# Patient Record
Sex: FEMALE | Race: WHITE | NOT HISPANIC OR LATINO | Employment: FULL TIME | URBAN - METROPOLITAN AREA
[De-identification: names, ages, dates, MRNs, and addresses within clinical notes are randomized per-mention and may not be internally consistent; named-entity substitution may affect disease eponyms.]

---

## 2017-01-04 ENCOUNTER — OFFICE VISIT (OUTPATIENT)
Dept: SURGERY | Facility: CLINIC | Age: 52
End: 2017-01-04
Payer: COMMERCIAL

## 2017-01-04 VITALS
TEMPERATURE: 98 F | WEIGHT: 174.19 LBS | DIASTOLIC BLOOD PRESSURE: 85 MMHG | HEART RATE: 88 BPM | BODY MASS INDEX: 24.29 KG/M2 | SYSTOLIC BLOOD PRESSURE: 127 MMHG

## 2017-01-04 DIAGNOSIS — R10.13 EPIGASTRIC ABDOMINAL PAIN: Primary | ICD-10-CM

## 2017-01-04 PROCEDURE — 99999 PR PBB SHADOW E&M-EST. PATIENT-LVL III: CPT | Mod: PBBFAC,,, | Performed by: SURGERY

## 2017-01-04 PROCEDURE — 3079F DIAST BP 80-89 MM HG: CPT | Mod: S$GLB,,, | Performed by: SURGERY

## 2017-01-04 PROCEDURE — 99203 OFFICE O/P NEW LOW 30 MIN: CPT | Mod: S$GLB,,, | Performed by: SURGERY

## 2017-01-04 PROCEDURE — 1159F MED LIST DOCD IN RCRD: CPT | Mod: S$GLB,,, | Performed by: SURGERY

## 2017-01-04 PROCEDURE — 3074F SYST BP LT 130 MM HG: CPT | Mod: S$GLB,,, | Performed by: SURGERY

## 2017-01-04 NOTE — PROGRESS NOTES
History & Physical    SUBJECTIVE:     History of Present Illness:  Patient is a 51 y.o. female present for eval of RUQ/epigastric abdominal pain. She reports the pain was so severe on [place] alaina that she went to ER. She had CT and U/S done at [facility] which were normal as well as labs (results in epic under media). She then was seen in [facility] ER and HIDA scan showed EF 73%. The patient reports she had nausea and severe pain that felt like her previous symptoms of sharp and stabbing although she also endorses crampy pain. The pain has progressed to constantly hurting and usually worse in the evening after dinner. She has also noticed increased frequency of bowel movements up to 4-5 times per day. She previously had EGD done a few years ago which showed gastritis from NSAID use per patient. She continues to smoke and avoid NSAID although has been taking ib profen since pain started.    Chief Complaint   Patient presents with    Consult       Review of patient's allergies indicates:   Allergen Reactions    Asa [aspirin] Swelling       Current Outpatient Prescriptions   Medication Sig Dispense Refill    ketorolac (TORADOL) 10 mg tablet Take 1 tablet (10 mg total) by mouth every 6 (six) hours as needed for Pain. Do not take more than 5 days in a row. 16 tablet 0     No current facility-administered medications for this visit.        History reviewed. No pertinent past medical history.  Past Surgical History   Procedure Laterality Date    Hysterectomy       section       History reviewed. No pertinent family history.  Social History   Substance Use Topics    Smoking status: Never Smoker    Smokeless tobacco: None    Alcohol use No        Review of Systems:  Review of Systems   Constitutional: Negative for chills and fever.   HENT: Negative for congestion.    Eyes: Negative for visual disturbance.   Respiratory: Negative for cough and shortness of breath.    Cardiovascular: Negative for chest pain,  palpitations and leg swelling.   Gastrointestinal: Positive for abdominal pain. Negative for abdominal distention, constipation, diarrhea, nausea and vomiting.   Endocrine: Negative for polyuria.   Genitourinary: Negative for dysuria.   Skin: Negative for rash.   Neurological: Negative for dizziness and light-headedness.   Hematological: Negative for adenopathy.       OBJECTIVE:     Vital Signs (Most Recent)  Temp: 98 °F (36.7 °C) (01/04/17 1553)  Pulse: 88 (01/04/17 1553)  BP: 127/85 (01/04/17 1553)     79 kg (174 lb 2.6 oz)     Physical Exam:  Physical Exam   Constitutional: She is oriented to person, place, and time. She appears well-developed and well-nourished.   HENT:   Head: Normocephalic and atraumatic.   Eyes: EOM are normal.   Neck: Neck supple.   Cardiovascular: Normal rate and regular rhythm.    Pulmonary/Chest: Effort normal and breath sounds normal.   Abdominal: Soft. Bowel sounds are normal. She exhibits no distension. There is tenderness (RUQ).   Neurological: She is alert and oriented to person, place, and time.   Skin: Skin is warm and dry.   Vitals reviewed.      ASSESSMENT/PLAN:     50 y/o female with RUQ/epigastric abdominal pain    PLAN:Plan     Imaging and labs normal although pain reproduced with CCK injection  Will set pt up for EGD to eval for gastric etiology, if negative will have discuss further options: including cholecystectomy

## 2017-01-04 NOTE — LETTER
January 4, 2017      Mckenna Sharpe,   [address] [place] [address] [address] [address]           [facility] General Surgery  [address] [place] [address]  [place] [address]  Phone: [phone]  Fax: [phone]          Patient: Tabitha Santos   MR Number: [id number]   YOB: 1965   Date of Visit: 1/4/2017       Dear Dr. Mckenna Sharpe:    Thank you for referring Tabitha Santos to me for evaluation. Attached you will find relevant portions of my assessment and plan of care.    If you have questions, please do not hesitate to call me. I look forward to following Tabitha Santos along with you.    Sincerely,    Emerson Lara MD    Enclosure  CC:  No Recipients    If you would like to receive this communication electronically, please contact [email or web address] or [phone] to request more information on Provider[employer] Link access.    For providers and/or their staff who would like to refer a patient to [facility], please contact us through our one-stop-shop provider referral line, [facility] , at [phone].    If you feel you have received this communication in error or would no longer like to receive these types of communications, please e-mail [email or web address]

## 2017-01-09 ENCOUNTER — TELEPHONE (OUTPATIENT)
Dept: SURGERY | Facility: CLINIC | Age: 52
End: 2017-01-09

## 2017-01-09 NOTE — TELEPHONE ENCOUNTER
"Returned call patient called to cancel EGD 1/11/17, stated," went to primary care doctor and was dx with Shingles."  will call the office back to reschedule.  "

## 2018-10-23 ENCOUNTER — HOSPITAL ENCOUNTER (EMERGENCY)
Facility: HOSPITAL | Age: 53
Discharge: HOME | End: 2018-10-23
Attending: EMERGENCY MEDICINE
Payer: COMMERCIAL

## 2018-10-23 VITALS
SYSTOLIC BLOOD PRESSURE: 119 MMHG | HEIGHT: 71 IN | HEART RATE: 98 BPM | WEIGHT: 180.69 LBS | DIASTOLIC BLOOD PRESSURE: 78 MMHG | BODY MASS INDEX: 25.3 KG/M2 | TEMPERATURE: 99 F | RESPIRATION RATE: 18 BRPM | OXYGEN SATURATION: 99 %

## 2018-10-23 DIAGNOSIS — M79.603 ARM PAIN: ICD-10-CM

## 2018-10-23 DIAGNOSIS — M25.529 ELBOW PAIN: ICD-10-CM

## 2018-10-23 DIAGNOSIS — S52.571A OTHER CLOSED INTRA-ARTICULAR FRACTURE OF DISTAL END OF RIGHT RADIUS, INITIAL ENCOUNTER: Primary | ICD-10-CM

## 2018-10-23 DIAGNOSIS — M25.539 WRIST PAIN: ICD-10-CM

## 2018-10-23 PROCEDURE — 29105 APPLICATION LONG ARM SPLINT: CPT | Mod: RT

## 2018-10-23 PROCEDURE — 25000003 PHARM REV CODE 250: Performed by: EMERGENCY MEDICINE

## 2018-10-23 PROCEDURE — 99284 EMERGENCY DEPT VISIT MOD MDM: CPT | Mod: 25

## 2018-10-23 RX ORDER — HYDROCODONE BITARTRATE AND ACETAMINOPHEN 7.5; 325 MG/1; MG/1
1 TABLET ORAL EVERY 6 HOURS PRN
Qty: 33 TABLET | Refills: 0 | Status: SHIPPED | OUTPATIENT
Start: 2018-10-23 | End: 2018-11-02

## 2018-10-23 RX ORDER — HYDROCODONE BITARTRATE AND ACETAMINOPHEN 10; 325 MG/1; MG/1
1 TABLET ORAL
Status: COMPLETED | OUTPATIENT
Start: 2018-10-23 | End: 2018-10-23

## 2018-10-23 RX ADMIN — HYDROCODONE BITARTRATE AND ACETAMINOPHEN 1 TABLET: 10; 325 TABLET ORAL at 09:10

## 2018-10-23 NOTE — ED PROVIDER NOTES
SCRIBE #1 NOTE: I, Shirley Dickerson, am scribing for, and in the presence of, Aaron Olmos MD. I have scribed the entire note.      History      Chief Complaint   Patient presents with    Arm Pain     pt reports pain to her Rt arm adn wrist after falling off a hover board yesterday       Review of patient's allergies indicates:   Allergen Reactions    Asa [aspirin] Swelling        HPI   HPI    10/23/2018, 9:16 AM   History obtained from the patient      History of Present Illness: Tabitha Santos is a 52 y.o. female patient who presents to the Emergency Department for RUE pain which onset suddenly after falling off of a hover board yesterday. Pt reports landing on R arm. Symptoms are constant and moderate in severity.  No mitigating or exacerbating factors reported. No associated sxs reported. Patient denies any head injury/trauma, LOC,  HA, dizziness, back pain, neck pain, abd pain, CP, SOB, extremity weakness/numbness, wound, other pain/injury, decreased ROM of RUE, and all other sxs at this time. No further complaints or concerns at this time.         Arrival mode: Personal vehicle      PCP: Primary Doctor No       Past Medical History:  Hx reviewed, not pertinent.    Past Surgical History:  Past Surgical History:   Procedure Laterality Date     SECTION      HYSTERECTOMY           Family History:  Hx reviewed, not pertinent.     Social History:  Social History     Tobacco Use    Smoking status: Never Smoker   Substance and Sexual Activity    Alcohol use: No    Drug use: No    Sexual activity: Unknown       ROS   Review of Systems   Constitutional: Negative for fever.   HENT: Negative for sore throat.    Respiratory: Negative for shortness of breath.    Cardiovascular: Negative for chest pain.   Gastrointestinal: Negative for abdominal pain and nausea.   Genitourinary: Negative for dysuria.   Musculoskeletal: Negative for back pain and neck pain.        + RUE pain -other pain/injury  -decreased ROM   Skin: Negative for rash and wound.   Neurological: Negative for dizziness, weakness, numbness and headaches.        -head injury/trauma -LOC   Hematological: Does not bruise/bleed easily.   All other systems reviewed and are negative.      Physical Exam      Initial Vitals [10/23/18 0911]   BP Pulse Resp Temp SpO2   138/85 89 18 98.7 °F (37.1 °C) 95 %      MAP       --          Physical Exam  Nursing Notes and Vital Signs Reviewed.  Constitutional: Patient is in no acute distress. Well-developed and well-nourished.  Head: Atraumatic. Normocephalic.  Eyes: PERRL. EOM intact. Conjunctivae are not pale. No scleral icterus.  ENT: Mucous membranes are moist. Oropharynx is clear and symmetric.    Neck: Supple. Full ROM. No lymphadenopathy.  Cardiovascular: Regular rate. Regular rhythm. No murmurs, rubs, or gallops. Distal pulses are 2+ and symmetric.  Pulmonary/Chest: No respiratory distress. Clear to auscultation bilaterally. No wheezing or rales.  Abdominal: Soft and non-distended.  There is no tenderness.  No rebound, guarding, or rigidity.   Musculoskeletal: Moves all extremities. No obvious deformities. No edema.   RUE: has no evident deformity. Tenderness to R elbow, R wrist, R forearm, and R humerus. Negative for swelling. ROM is normal. Cap refill distally is <2 seconds. Radial pulses are equal and 2+ bilaterally. No motor deficit. No distal sensory deficit.  Skin: Warm and dry.  Neurological:  Alert, awake, and appropriate.  Normal speech.  No acute focal neurological deficits are appreciated.  Psychiatric: Normal affect. Good eye contact. Appropriate in content.    ED Course    Orthopedic Injury  Date/Time: 10/23/2018 10:07 AM  Performed by: Aaron Olmos MD  Authorized by: Aaron Olmos MD     Consent Done?:  Yes  Universal Protocol:     Verbal consent obtained?: Yes      Consent given by:  Patient    Patient states understanding of procedure being performed: Yes    Injury:     Injury  "location:  Wrist    Location details:  Right wrist    Injury type:  Fracture    Fracture type: distal radius      Fracture type: distal radius        Pre-procedure assessment:     Neurovascular status: Neurovascularly intact      Distal perfusion: normal      Neurological function: normal      Range of motion: normal      Local anesthesia used?: No      Patient sedated?: No        Selections made in this section will also lock the Injury type section above.:     Manipulation performed?: No      Immobilization:  Splint    Splint type:  Sugar tong    Supplies used:  Ortho-Glass    Complications: No      Specimens: No      Implants: No    Post-procedure assessment:     Neurovascular status: Neurovascularly intact      Distal perfusion: normal      Neurological function: normal      Range of motion: splinted      Patient tolerance:  Patient tolerated the procedure well with no immediate complications      ED Vital Signs:  Vitals:    10/23/18 0911   BP: 138/85   Pulse: 89   Resp: 18   Temp: 98.7 °F (37.1 °C)   TempSrc: Oral   SpO2: 95%   Weight: 81.9 kg (180 lb 10.7 oz)   Height: 5' 11" (1.803 m)       Abnormal Lab Results:  Labs Reviewed - No data to display     All Lab Results:      Imaging Results:  Imaging Results          X-Ray Humerus 2 View Right (Final result)  Result time 10/23/18 09:48:17    Final result by Enoc Moe MD (10/23/18 09:48:17)                 Impression:      No acute findings.      Electronically signed by: Enoc Moe MD  Date:    10/23/2018  Time:    09:48             Narrative:    EXAMINATION:  XR HUMERUS 2 VIEW RIGHT    CLINICAL HISTORY:  Pain in arm, unspecified    TECHNIQUE:  Standard radiography performed.    COMPARISON:  None    FINDINGS:  The right humerus is intact.  There is a small calcification adjacent to the humeral head most consistent with calcific bursitis or tendinitis.                               X-Ray Wrist Complete Right (Final result)  Result time 10/23/18 " 09:46:54    Final result by Enoc Moe MD (10/23/18 09:46:54)                 Impression:      Findings consistent with nondisplaced distal radial fracture involving the articular surface of the radiocarpal joint.      Electronically signed by: Enoc Moe MD  Date:    10/23/2018  Time:    09:46             Narrative:    EXAMINATION:  XR WRIST COMPLETE 3 VIEWS RIGHT    CLINICAL HISTORY:  Pain in unspecified wrist    TECHNIQUE:  Standard radiography performed.    COMPARISON:  None    FINDINGS:  There is a lucent fracture line involving the distal articular surface of the right radius.  No significant displacement or depression is seen.    The distal ulna appears intact.    Mild incidental DJD of the thumb base is present.                               X-Ray Elbow Complete Right (Final result)  Result time 10/23/18 09:47:33    Final result by Enoc Moe MD (10/23/18 09:47:33)                 Impression:      Negative exam.      Electronically signed by: Enoc Moe MD  Date:    10/23/2018  Time:    09:47             Narrative:    EXAMINATION:  XR ELBOW COMPLETE 3 VIEW RIGHT    CLINICAL HISTORY:  Right elbow injury with joint pain, pain in unspecified elbow    TECHNIQUE:  Standard radiography performed.    COMPARISON:  None    FINDINGS:  Bone density and architecture are normal.  No acute findings.                               Current Discharge Medication List      START taking these medications    Details   HYDROcodone-acetaminophen (NORCO) 7.5-325 mg per tablet Take 1 tablet by mouth every 6 (six) hours as needed for Pain.  Qty: 33 tablet, Refills: 0                    The Emergency Provider reviewed the vital signs and test results, which are outlined above.    ED Discussion     10:03 AM: Re-evaluated pt.  D/w pt all pertinent results. D/w pt any concerns expressed at this time. Answered all questions. Pt expresses understanding at this time.    10:08 AM: Reassessed pt at this time.   Pt is awake, alert, and in NAD at this time. Discussed with pt all pertinent ED information and results. Discussed pt dx and plan of tx. Gave pt all f/u and return to the ED instructions. All questions and concerns were addressed at this time. Pt expresses understanding of information and instructions, and is comfortable with plan to discharge. Pt is stable for discharge.    Driving or other activities under influence of medications - Patient and/or family/caretaker was given a prescription for, or instructed to use a medicine that may impair ability to drive, operate machinery, or participate in other potentially dangerous activities.  Patient was instructed not to participate in these activities while under the influence of these medications.    I discussed with patient and/or family/caretaker that evaluation in the ED does not suggest any emergent or life threatening medical conditions requiring immediate intervention beyond what was provided in the ED, and I believe patient is safe for discharge.  Regardless, an unremarkable evaluation in the ED does not preclude the development or presence of a serious of life threatening condition. As such, patient was instructed to return immediately for any worsening or change in current symptoms.      ED Medication(s):  Medications   HYDROcodone-acetaminophen  mg per tablet 1 tablet (1 tablet Oral Given 10/23/18 0949)       Follow-up Information     Ortho In 2 days.    Contact information:  [phone]                   Medical Decision Making    Medical Decision Making:   Clinical Tests:   Radiological Study: Ordered and Reviewed           Scribe Attestation:   Scribe #1: I performed the above scribed service and the documentation accurately describes the services I performed. I attest to the accuracy of the note.    Attending:   Physician Attestation Statement for Scribe #1: I, Aaron Olmos MD, personally performed the services described in this documentation, as  scribed by Shirley Dickerson, in my presence, and it is both accurate and complete.          Clinical Impression       ICD-10-CM ICD-9-CM   1. Other closed intra-articular fracture of distal end of right radius, initial encounter S52.571A 813.42   2. Arm pain M79.603 729.5   3. Elbow pain M25.529 719.42   4. Wrist pain M25.539 719.43       Disposition:   Disposition: Discharged  Condition: Stable         Aaron Olmos MD  10/23/18 1028

## 2018-10-25 ENCOUNTER — OFFICE VISIT (OUTPATIENT)
Dept: ORTHOPEDICS | Facility: CLINIC | Age: 53
End: 2018-10-25
Payer: COMMERCIAL

## 2018-10-25 VITALS
BODY MASS INDEX: 25.2 KG/M2 | HEART RATE: 80 BPM | WEIGHT: 180 LBS | HEIGHT: 71 IN | DIASTOLIC BLOOD PRESSURE: 74 MMHG | RESPIRATION RATE: 19 BRPM | SYSTOLIC BLOOD PRESSURE: 131 MMHG

## 2018-10-25 DIAGNOSIS — G89.11 ACUTE PAIN OF RIGHT SHOULDER DUE TO TRAUMA: ICD-10-CM

## 2018-10-25 DIAGNOSIS — S62.101A WRIST FRACTURE, CLOSED, RIGHT, INITIAL ENCOUNTER: Primary | ICD-10-CM

## 2018-10-25 DIAGNOSIS — M25.511 ACUTE PAIN OF RIGHT SHOULDER DUE TO TRAUMA: ICD-10-CM

## 2018-10-25 PROCEDURE — 99999 PR PBB SHADOW E&M-EST. PATIENT-LVL III: CPT | Mod: PBBFAC,,, | Performed by: PHYSICIAN ASSISTANT

## 2018-10-25 PROCEDURE — 3008F BODY MASS INDEX DOCD: CPT | Mod: CPTII,S$GLB,, | Performed by: PHYSICIAN ASSISTANT

## 2018-10-25 PROCEDURE — 99204 OFFICE O/P NEW MOD 45 MIN: CPT | Mod: S$GLB,,, | Performed by: PHYSICIAN ASSISTANT

## 2018-10-25 RX ORDER — MELATONIN 5 MG
5 CAPSULE ORAL
COMMUNITY

## 2018-10-25 RX ORDER — TRAMADOL HYDROCHLORIDE 50 MG/1
50 TABLET ORAL EVERY 6 HOURS PRN
Qty: 30 TABLET | Refills: 0 | Status: SHIPPED | OUTPATIENT
Start: 2018-10-25 | End: 2018-11-04

## 2018-10-25 NOTE — PROGRESS NOTES
CC:  52-year-old female right arm pain    Date of injury 2018    HPI:  On the date above the patient fell off her Hovver board landing on her right arm.  She reports emergency room    PMH:  History reviewed. No pertinent past medical history.    PSH:    Past Surgical History:   Procedure Laterality Date     SECTION      HYSTERECTOMY         Family Hx:  History reviewed. No pertinent family history.    Allergy:    Review of patient's allergies indicates:   Allergen Reactions    Asa [aspirin] Swelling       Medication:    Current Outpatient Medications:     HYDROcodone-acetaminophen (NORCO) 7.5-325 mg per tablet, Take 1 tablet by mouth every 6 (six) hours as needed for Pain., Disp: 33 tablet, Rfl: 0    Social History:    Social History     Socioeconomic History    Marital status: Single     Spouse name: Not on file    Number of children: Not on file    Years of education: Not on file    Highest education level: Not on file   Social Needs    Financial resource strain: Not on file    Food insecurity - worry: Not on file    Food insecurity - inability: Not on file    Transportation needs - medical: Not on file    Transportation needs - non-medical: Not on file   Occupational History    Not on file   Tobacco Use    Smoking status: Never Smoker   Substance and Sexual Activity    Alcohol use: No    Drug use: No    Sexual activity: Not on file   Other Topics Concern    Not on file   Social History Narrative    Not on file       Vitals:   There were no vitals taken for this visit.     ROS:  GENERAL: No fever, chills, fatigability or weight loss.  NEUROLOGIC: No history of seizures, paralysis, alteration of gait or coordination.  MUSCULOSKELETAL: See HPI    PE:  APPEARANCE: Well nourished, well developed, in no acute distress.   MUSCULOSKELETAL  MUSCULOSKELETAL:   Right shoulder elbow and wrist   cast splint intact., 2 plus distal pulses, light touch intact Right  upper extremity.  All  digits are warm. No erythema, no warmth, no drainage, tenderness along the right AC joint.  No deformity appreciated.  To his long the right elbow.  Her fingers blood very well.  Assessment:  Patient stands she has a fractured right wrist.  The requires a CT to fully evaluate           Diagnosis:              1.  Right wrist fracture               2.  Right elbow contusion                3.  Right shoulder pain status post trauma    Diagnostic Studies  MRI-No   X-Ray-YES, I agree with the findings of  Wrist-  There is a lucent fracture line involving the distal articular surface of the right radius.  No significant displacement or depression is seen.    The distal ulna appears intact.    Mild incidental DJD of the thumb base is present.      Impression       Findings consistent with nondisplaced distal radial fracture involving the articular surface of the radiocarpal joint.         EMG/NCV-No  Arthrogram-No  Bone Scan-No  CT Scan-of the right wrist and right elbow staples trauma and fractures to further definitive the fracture.    Plan:                                                 1. PT-no                                                 2.OT-no                                          3.NSAID-no                                        4. Narcotics-      tramadol                              5. Wound care-N/A                                 6. Rest-no                                           7. Surgery-no                                         8. DINA Hose-no                                    9. Anticoagulation therapy-no               10. Elevation-no                                     11. Crutches-no                                    12. Walker-no             13. Cane no                        14. Referral-no                                     15.Injection-no                            16. Splint   /    Cast   /   Cast Shoe-current splint was modified with the cast material cut from on the palm of her hand.               17. RICE            18. Follow up-  after CT

## 2018-11-08 ENCOUNTER — TELEPHONE (OUTPATIENT)
Dept: RADIOLOGY | Facility: HOSPITAL | Age: 53
End: 2018-11-08

## 2018-11-12 ENCOUNTER — TELEPHONE (OUTPATIENT)
Dept: RADIOLOGY | Facility: HOSPITAL | Age: 53
End: 2018-11-12

## 2018-11-21 ENCOUNTER — HOSPITAL ENCOUNTER (EMERGENCY)
Facility: HOSPITAL | Age: 53
Discharge: HOME | End: 2018-11-21
Attending: EMERGENCY MEDICINE
Payer: COMMERCIAL

## 2018-11-21 VITALS
DIASTOLIC BLOOD PRESSURE: 58 MMHG | SYSTOLIC BLOOD PRESSURE: 106 MMHG | WEIGHT: 179.88 LBS | TEMPERATURE: 98 F | OXYGEN SATURATION: 99 % | BODY MASS INDEX: 24.36 KG/M2 | RESPIRATION RATE: 19 BRPM | HEIGHT: 72 IN | HEART RATE: 67 BPM

## 2018-11-21 DIAGNOSIS — R10.10 PAIN OF UPPER ABDOMEN: Primary | ICD-10-CM

## 2018-11-21 DIAGNOSIS — R07.9 CHEST PAIN: ICD-10-CM

## 2018-11-21 LAB
ALBUMIN SERPL BCP-MCNC: 3.8 G/DL
ALP SERPL-CCNC: 62 U/L
ALT SERPL W/O P-5'-P-CCNC: 14 U/L
ANION GAP SERPL CALC-SCNC: 8 MMOL/L
AST SERPL-CCNC: 24 U/L
BASOPHILS # BLD AUTO: 0.01 K/UL
BASOPHILS NFR BLD: 0.1 %
BILIRUB SERPL-MCNC: 0.5 MG/DL
BNP SERPL-MCNC: 58 PG/ML
BUN SERPL-MCNC: 21 MG/DL
CALCIUM SERPL-MCNC: 8.9 MG/DL
CHLORIDE SERPL-SCNC: 106 MMOL/L
CK SERPL-CCNC: 252 U/L
CO2 SERPL-SCNC: 25 MMOL/L
CREAT SERPL-MCNC: 0.7 MG/DL
DIFFERENTIAL METHOD: ABNORMAL
EOSINOPHIL # BLD AUTO: 0.1 K/UL
EOSINOPHIL NFR BLD: 0.6 %
ERYTHROCYTE [DISTWIDTH] IN BLOOD BY AUTOMATED COUNT: 13.1 %
EST. GFR  (AFRICAN AMERICAN): >60 ML/MIN/1.73 M^2
EST. GFR  (NON AFRICAN AMERICAN): >60 ML/MIN/1.73 M^2
GLUCOSE SERPL-MCNC: 92 MG/DL
HCT VFR BLD AUTO: 38.8 %
HGB BLD-MCNC: 13 G/DL
INR PPP: 0.9
LIPASE SERPL-CCNC: 25 U/L
LYMPHOCYTES # BLD AUTO: 2.7 K/UL
LYMPHOCYTES NFR BLD: 24.7 %
MCH RBC QN AUTO: 31.6 PG
MCHC RBC AUTO-ENTMCNC: 33.5 G/DL
MCV RBC AUTO: 94 FL
MONOCYTES # BLD AUTO: 0.7 K/UL
MONOCYTES NFR BLD: 5.9 %
NEUTROPHILS # BLD AUTO: 7.6 K/UL
NEUTROPHILS NFR BLD: 68.7 %
PLATELET # BLD AUTO: 183 K/UL
PMV BLD AUTO: 9.7 FL
POTASSIUM SERPL-SCNC: 4.1 MMOL/L
PROT SERPL-MCNC: 6.6 G/DL
PROTHROMBIN TIME: 10.3 SEC
RBC # BLD AUTO: 4.12 M/UL
SODIUM SERPL-SCNC: 139 MMOL/L
TROPONIN I SERPL DL<=0.01 NG/ML-MCNC: <0.006 NG/ML
WBC # BLD AUTO: 11.02 K/UL

## 2018-11-21 PROCEDURE — 83690 ASSAY OF LIPASE: CPT

## 2018-11-21 PROCEDURE — 25000003 PHARM REV CODE 250: Performed by: NURSE PRACTITIONER

## 2018-11-21 PROCEDURE — 93005 ELECTROCARDIOGRAM TRACING: CPT

## 2018-11-21 PROCEDURE — 93010 ELECTROCARDIOGRAM REPORT: CPT | Mod: ,,, | Performed by: INTERNAL MEDICINE

## 2018-11-21 PROCEDURE — 84484 ASSAY OF TROPONIN QUANT: CPT

## 2018-11-21 PROCEDURE — 83880 ASSAY OF NATRIURETIC PEPTIDE: CPT

## 2018-11-21 PROCEDURE — 82550 ASSAY OF CK (CPK): CPT

## 2018-11-21 PROCEDURE — 99285 EMERGENCY DEPT VISIT HI MDM: CPT | Mod: 25

## 2018-11-21 PROCEDURE — 85610 PROTHROMBIN TIME: CPT

## 2018-11-21 PROCEDURE — 85025 COMPLETE CBC W/AUTO DIFF WBC: CPT

## 2018-11-21 PROCEDURE — 80053 COMPREHEN METABOLIC PANEL: CPT

## 2018-11-21 RX ORDER — OMEPRAZOLE 20 MG/1
40 CAPSULE, DELAYED RELEASE ORAL DAILY
Qty: 60 CAPSULE | Refills: 0 | Status: SHIPPED | OUTPATIENT
Start: 2018-11-21 | End: 2018-12-21

## 2018-11-21 RX ORDER — SUCRALFATE 1 G/1
1 TABLET ORAL
Qty: 28 TABLET | Refills: 0 | Status: SHIPPED | OUTPATIENT
Start: 2018-11-21 | End: 2018-11-28

## 2018-11-21 RX ADMIN — DICYCLOMINE HYDROCHLORIDE 50 ML: 10 SOLUTION ORAL at 10:11

## 2018-11-21 NOTE — ED PROVIDER NOTES
Encounter Date: 2018       History     Chief Complaint   Patient presents with    Chest Pain     3 sprays of NTG per [facility]     52 year old female with complaint of upper epigastric pain with radiation into chest since 8 am this morning.  Reports pain occurred at rest and suddenly and felt like a stabbing type pain.  Reports pain began to subside prior to ambulance arriving and was given 3 sprays of nitro.  Pt still reports pain as a 5.  No SOB. No diaphoresis.            Review of patient's allergies indicates:   Allergen Reactions    Asa [aspirin] Swelling     History reviewed. No pertinent past medical history.  Past Surgical History:   Procedure Laterality Date     SECTION      HYSTERECTOMY       History reviewed. No pertinent family history.  Social History     Tobacco Use    Smoking status: Current Every Day Smoker    Smokeless tobacco: Never Used   Substance Use Topics    Alcohol use: No    Drug use: No     Review of Systems   Constitutional: Negative for fever.   HENT: Negative for sore throat.    Respiratory: Negative for shortness of breath.    Cardiovascular: Positive for chest pain.   Gastrointestinal: Positive for abdominal pain. Negative for nausea.   Genitourinary: Negative for dysuria.   Musculoskeletal: Negative for back pain.   Skin: Negative for rash.   Neurological: Negative for weakness.   Hematological: Does not bruise/bleed easily.       Physical Exam     Initial Vitals [18 0926]   BP Pulse Resp Temp SpO2   106/61 73 17 97.9 °F (36.6 °C) (!) 94 %      MAP       --         Physical Exam    Nursing note and vitals reviewed.  Constitutional: She appears well-developed and well-nourished.   HENT:   Head: Normocephalic and atraumatic.   Eyes: Conjunctivae and EOM are normal. Pupils are equal, round, and reactive to light.   Neck: Normal range of motion. Neck supple.   Cardiovascular: Normal rate, regular rhythm, normal heart sounds and intact distal pulses.    Pulmonary/Chest: Breath sounds normal.   Abdominal: Soft. There is tenderness. There is no rebound and no guarding.   Upper abdominal tenderness, no guarding   Musculoskeletal: Normal range of motion.   Neurological: She is alert and oriented to person, place, and time. She has normal strength and normal reflexes.   Skin: Skin is warm and dry.   Psychiatric: She has a normal mood and affect. Her behavior is normal. Thought content normal.         ED Course   Procedures  Labs Reviewed   CBC W/ AUTO DIFFERENTIAL - Abnormal; Notable for the following components:       Result Value    MCH 31.6 (*)     All other components within normal limits   COMPREHENSIVE METABOLIC PANEL   B-TYPE NATRIURETIC PEPTIDE   TROPONIN I   PROTIME-INR     EKG Readings: (Independently Interpreted)   Other EKG Interpretations: EKG: NSR with rate of 69, no st or t wave abnormalities       Imaging Results    None       Imaging Results          X-Ray Chest PA And Lateral (Final result)  Result time 11/21/18 10:09:16    Final result by SHILOH Briceño Sr., MD (11/21/18 10:09:16)                 Impression:      Normal study.      Electronically signed by: Todd Briceño MD  Date:    11/21/2018  Time:    10:09             Narrative:    EXAMINATION:  XR CHEST PA AND LATERAL    CLINICAL HISTORY:  Chest Pain;    COMPARISON:  12/26/2016    FINDINGS:  The size and contour of the heart are normal. The lungs are clear. There is no pneumothorax or pleural effusion.                                   Medical Decision Making:   Pt had complete relief after GI cocktail.  Pain began epigastric region and radiated up to chest.  Pain was constant until GI cocktail given.  Initial cardiac workout negative.  Recommended to repeat cardiac enzymes at the 6 hour giuliana from onset of pain.  Pt does not want to wait for repeat blood work and pt requesting discharge.  Pt completely ware that MI can not be completely ruled out without second set of cardiac enzymes.  Pt  understands risks and states will return for return of chest pain.      Labs Reviewed   CBC W/ AUTO DIFFERENTIAL - Abnormal; Notable for the following components:       Result Value    MCH 31.6 (*)     All other components within normal limits   COMPREHENSIVE METABOLIC PANEL - Abnormal; Notable for the following components:    BUN, Bld 21 (*)     All other components within normal limits   B-TYPE NATRIURETIC PEPTIDE   TROPONIN I   PROTIME-INR   CK   LIPASE                         Clinical Impression:   The primary encounter diagnosis was Pain of upper abdomen. A diagnosis of Chest pain was also pertinent to this visit.                             Beltran Tamayo NP  11/21/18 2401

## 2021-03-07 ENCOUNTER — HOSPITAL ENCOUNTER (EMERGENCY)
Facility: HOSPITAL | Age: 56
Discharge: HOME | End: 2021-03-07
Attending: EMERGENCY MEDICINE
Payer: COMMERCIAL

## 2021-03-07 VITALS
TEMPERATURE: 96 F | HEIGHT: 72 IN | DIASTOLIC BLOOD PRESSURE: 73 MMHG | HEART RATE: 80 BPM | OXYGEN SATURATION: 99 % | RESPIRATION RATE: 14 BRPM | BODY MASS INDEX: 24.4 KG/M2 | SYSTOLIC BLOOD PRESSURE: 130 MMHG

## 2021-03-07 DIAGNOSIS — J20.9 ACUTE BRONCHITIS, UNSPECIFIED ORGANISM: Primary | ICD-10-CM

## 2021-03-07 DIAGNOSIS — J32.9 SINUSITIS, UNSPECIFIED CHRONICITY, UNSPECIFIED LOCATION: ICD-10-CM

## 2021-03-07 DIAGNOSIS — R05.9 COUGH: ICD-10-CM

## 2021-03-07 LAB
INFLUENZA A, MOLECULAR: NEGATIVE
INFLUENZA B, MOLECULAR: NEGATIVE
SARS-COV-2 RDRP RESP QL NAA+PROBE: NEGATIVE
SPECIMEN SOURCE: NORMAL

## 2021-03-07 PROCEDURE — 99284 EMERGENCY DEPT VISIT MOD MDM: CPT | Mod: 25

## 2021-03-07 PROCEDURE — 87502 INFLUENZA DNA AMP PROBE: CPT | Performed by: NURSE PRACTITIONER

## 2021-03-07 PROCEDURE — 25000003 PHARM REV CODE 250: Performed by: NURSE PRACTITIONER

## 2021-03-07 PROCEDURE — U0002 COVID-19 LAB TEST NON-CDC: HCPCS | Performed by: NURSE PRACTITIONER

## 2021-03-07 RX ORDER — FLUTICASONE PROPIONATE 50 MCG
1 SPRAY, SUSPENSION (ML) NASAL 2 TIMES DAILY
Qty: 15.8 ML | Refills: 0 | Status: SHIPPED | OUTPATIENT
Start: 2021-03-07 | End: 2021-04-06

## 2021-03-07 RX ORDER — PROMETHAZINE HYDROCHLORIDE AND DEXTROMETHORPHAN HYDROBROMIDE 6.25; 15 MG/5ML; MG/5ML
10 SYRUP ORAL EVERY 6 HOURS PRN
Qty: 150 ML | Refills: 0 | Status: SHIPPED | OUTPATIENT
Start: 2021-03-07 | End: 2021-03-17

## 2021-03-07 RX ORDER — CETIRIZINE HYDROCHLORIDE 10 MG/1
10 TABLET ORAL DAILY
Qty: 30 TABLET | Refills: 0 | Status: SHIPPED | OUTPATIENT
Start: 2021-03-07 | End: 2022-03-07

## 2021-03-07 RX ORDER — ACETAMINOPHEN 500 MG
1000 TABLET ORAL
Status: COMPLETED | OUTPATIENT
Start: 2021-03-07 | End: 2021-03-07

## 2021-03-07 RX ORDER — AZITHROMYCIN 250 MG/1
250 TABLET, FILM COATED ORAL DAILY
Qty: 6 TABLET | Refills: 0 | Status: SHIPPED | OUTPATIENT
Start: 2021-03-07

## 2021-03-07 RX ADMIN — ACETAMINOPHEN 1000 MG: 500 TABLET ORAL at 12:03

## 2022-05-26 ENCOUNTER — HOSPITAL ENCOUNTER (EMERGENCY)
Facility: HOSPITAL | Age: 57
Discharge: HOME | End: 2022-05-26
Attending: FAMILY MEDICINE
Payer: COMMERCIAL

## 2022-05-26 VITALS
OXYGEN SATURATION: 97 % | DIASTOLIC BLOOD PRESSURE: 68 MMHG | HEIGHT: 72 IN | WEIGHT: 175 LBS | HEART RATE: 82 BPM | TEMPERATURE: 99 F | RESPIRATION RATE: 18 BRPM | SYSTOLIC BLOOD PRESSURE: 137 MMHG | BODY MASS INDEX: 23.7 KG/M2

## 2022-05-26 DIAGNOSIS — M25.512 LEFT SHOULDER PAIN: ICD-10-CM

## 2022-05-26 PROCEDURE — 99283 EMERGENCY DEPT VISIT LOW MDM: CPT

## 2022-05-26 RX ORDER — TRAMADOL HYDROCHLORIDE 50 MG/1
50 TABLET ORAL EVERY 6 HOURS PRN
Qty: 12 TABLET | Refills: 0 | OUTPATIENT
Start: 2022-05-26 | End: 2023-01-14

## 2022-05-26 NOTE — ED PROVIDER NOTES
HISTORY     Chief Complaint   Patient presents with    Shoulder Pain     Pt states that a box fell on her left shoulder while at work about end of March ( workman's comp completed at the time ), now reports worsened pain  and limited ability to use the affected site      Review of patient's allergies indicates:   Allergen Reactions    Asa [aspirin] Swelling        HPI   48-year-old female presents emergency department for left shoulder pain.  Patient reports back in March she had a box that was filled with laundry detergent that fell onto her left shoulder.  Patient reports her pain is gradually worsened.  Patient reports she has tried over-the-counter medications without any relief.  Patient denies any fever, chills, chest pain, back pain, shortness of breath, and all other concerns at this time.  Patient reports pain is worsened with range of motion left shoulder.    The history is provided by the patient. No  was used.        PCP: Primary Doctor No     Past Medical History:  No past medical history on file.     Past Surgical History:  Past Surgical History:   Procedure Laterality Date     SECTION      HYSTERECTOMY          Family History:  No family history on file.     Social History:  Social History     Tobacco Use    Smoking status: Current Every Day Smoker    Smokeless tobacco: Never Used   Substance and Sexual Activity    Alcohol use: No    Drug use: No    Sexual activity: Not on file         ROS   Review of Systems   Constitutional: Negative for fever.   HENT: Negative for sore throat.    Respiratory: Negative for shortness of breath.    Cardiovascular: Negative for chest pain.   Gastrointestinal: Negative for nausea.   Genitourinary: Negative for dysuria.   Musculoskeletal: Positive for arthralgias and joint swelling. Negative for back pain.   Skin: Negative for rash.   Neurological: Negative for weakness.   Hematological: Does not bruise/bleed easily.  "      PHYSICAL EXAM     Initial Vitals [05/26/22 1625]   BP Pulse Resp Temp SpO2   137/68 82 18 98.7 °F (37.1 °C) 97 %      MAP       --           Physical Exam    Nursing note and vitals reviewed.  Constitutional: She appears well-developed and well-nourished. She is not diaphoretic. No distress.   HENT:   Head: Normocephalic and atraumatic.   Eyes: Right eye exhibits no discharge. Left eye exhibits no discharge.   Neck: Neck supple.   Normal range of motion.  Cardiovascular: Normal rate.   Pulmonary/Chest: No respiratory distress.   Abdominal: Abdomen is soft. She exhibits no distension.   Musculoskeletal:      Cervical back: Normal range of motion and neck supple.      Comments: Decreased range of motion of the left shoulder secondary to pain.  Mild swelling noted around the shoulder.There is no obvious deformity.  Distal pulses are 2+.     Neurological: She is alert and oriented to person, place, and time. She has normal strength.   Skin: Skin is warm and dry.   Psychiatric: She has a normal mood and affect. Her behavior is normal. Thought content normal.          ED COURSE   Splint Application    Date/Time: 5/26/2022 5:21 PM  Performed by: Lencho Harrison NP  Authorized by: Lesia Franklin MD   Consent Done: Yes  Consent: Verbal consent obtained. Written consent not obtained.  Risks and benefits: risks, benefits and alternatives were discussed  Consent given by: patient  Patient understanding: patient states understanding of the procedure being performed  Patient consent: the patient's understanding of the procedure matches consent given  Procedure consent: procedure consent matches procedure scheduled  Patient identity confirmed: name  Time out: Immediately prior to procedure a "time out" was called to verify the correct patient, procedure, equipment, support staff and site/side marked as required.  Location details: left arm  Splint type: Arm sling.  Supplies used: Arm sling.  Post-procedure: The " "splinted body part was neurovascularly unchanged following the procedure.  Patient tolerance: Patient tolerated the procedure well with no immediate complications        ED ONGOING VITALS:  Vitals:    05/26/22 1625   BP: 137/68   Pulse: 82   Resp: 18   Temp: 98.7 °F (37.1 °C)   TempSrc: Oral   SpO2: 97%   Weight: 79.4 kg (175 lb)   Height: 5' 11.5" (1.816 m)         ABNORMAL LAB VALUES:  Labs Reviewed - No data to display      ALL LAB VALUES:  none    RADIOLOGY STUDIES:  Imaging Results          X-Ray Shoulder Trauma Left (Final result)  Result time 05/26/22 16:56:45    Final result by Aric Darling MD (05/26/22 16:56:45)                 Impression:      1.  Negative for acute process.    2.  Incidental findings as noted above.      Electronically signed by: Aric Darling MD  Date:    05/26/2022  Time:    16:56             Narrative:    EXAMINATION:  XR SHOULDER TRAUMA 3 VIEW LEFT    CLINICAL HISTORY:  Pain in left shoulder    TECHNIQUE:  Three views of the left shoulder were performed.    COMPARISON  None    FINDINGS:  The glenohumeral joint and acromioclavicular joint are well aligned.  Coracoclavicular distance is normal.  Calcific tendinitis overlies the greater tuberosity.  Negative for fracture or dislocation.    Visualized portions of the chest are clear.  Convex left curvature of the upper thoracic spine.  Tortuous aorta.                                          The above vital signs and test results have been reviewed by the emergency provider.     ED Medications:  Medications - No data to display    Current Discharge Medication List      START taking these medications    Details   traMADoL (ULTRAM) 50 mg tablet Take 1 tablet (50 mg total) by mouth every 6 (six) hours as needed for Pain.  Qty: 12 tablet, Refills: 0    Comments: Quantity prescribed more than 7 day supply? No           Discharge Medications:  New Prescriptions    TRAMADOL (ULTRAM) 50 MG TABLET    Take 1 tablet (50 mg total) by mouth every 6 " (six) hours as needed for Pain.       Follow-up Information     [facility].    Why: As needed  Contact information:  [address] [place] [address] [address]  [place] [address]  [phone]                        5:21 PM    I discussed with patient and/or family/caretaker that evaluation in the ED does not suggest any emergent or life threatening medical conditions requiring immediate intervention beyond what was provided in the ED, and I believe patient is safe for discharge. Regardless, an unremarkable evaluation in the ED does not preclude the development or presence of a serious or life threatening condition. As such, patient was instructed to return immediately for any worsening or change in current symptoms.        MEDICAL DECISION MAKING                 CLINICAL IMPRESSION       ICD-10-CM ICD-9-CM   1. Left shoulder pain  M25.512 719.41       Disposition:   Disposition: Discharged  Condition: Stable         Lencho Harrison NP  05/26/22 1106

## 2023-01-14 ENCOUNTER — HOSPITAL ENCOUNTER (EMERGENCY)
Facility: HOSPITAL | Age: 58
Discharge: HOME | End: 2023-01-14
Attending: EMERGENCY MEDICINE
Payer: COMMERCIAL

## 2023-01-14 VITALS
DIASTOLIC BLOOD PRESSURE: 78 MMHG | SYSTOLIC BLOOD PRESSURE: 143 MMHG | RESPIRATION RATE: 18 BRPM | BODY MASS INDEX: 26.73 KG/M2 | HEART RATE: 93 BPM | TEMPERATURE: 98 F | WEIGHT: 194.31 LBS | OXYGEN SATURATION: 99 %

## 2023-01-14 DIAGNOSIS — M54.50 LOW BACK PAIN, UNSPECIFIED BACK PAIN LATERALITY, UNSPECIFIED CHRONICITY, UNSPECIFIED WHETHER SCIATICA PRESENT: Primary | ICD-10-CM

## 2023-01-14 PROCEDURE — 99283 EMERGENCY DEPT VISIT LOW MDM: CPT

## 2023-01-14 PROCEDURE — 25000003 PHARM REV CODE 250: Performed by: NURSE PRACTITIONER

## 2023-01-14 RX ORDER — HYDROCODONE BITARTRATE AND ACETAMINOPHEN 5; 325 MG/1; MG/1
1 TABLET ORAL EVERY 6 HOURS PRN
Qty: 12 TABLET | Refills: 0 | Status: SHIPPED | OUTPATIENT
Start: 2023-01-14 | End: 2023-01-17

## 2023-01-14 RX ORDER — HYDROCODONE BITARTRATE AND ACETAMINOPHEN 5; 325 MG/1; MG/1
1 TABLET ORAL
Status: COMPLETED | OUTPATIENT
Start: 2023-01-14 | End: 2023-01-14

## 2023-01-14 RX ADMIN — HYDROCODONE BITARTRATE AND ACETAMINOPHEN 1 TABLET: 5; 325 TABLET ORAL at 02:01

## 2023-01-14 NOTE — ED PROVIDER NOTES
"Encounter Date: 2023       History     Chief Complaint   Patient presents with    Back Pain     Pt states she turned her torso to turn on the light last night and felt a "catch" in her back. Pt reports ongoing pain in lower back and numbness/tingling to legs since, states had to leave work due to feeling the "catch" again this morning and almost falling     The history is provided by the patient.   Back Pain   This is a new problem. The current episode started yesterday (Patient states she performed left lateral  twisting motion at the waist last night in order to turn a light switch off, then began starting having midline lower back pain.). The problem occurs constantly. The problem has been unchanged. Associated with: Twisting motion at the waist. The pain is present in the lumbar spine. The quality of the pain is described as shooting. The pain radiates to the left leg and right leg. The symptoms are aggravated by bending, twisting and certain positions. Associated symptoms include leg pain. Pertinent negatives include no chest pain, no fever, no headaches, no abdominal pain, no bowel incontinence, no perianal numbness, no bladder incontinence, no dysuria, no pelvic pain and no weakness. Treatments tried: Aleve. The treatment provided no relief.   Review of patient's allergies indicates:   Allergen Reactions    Asa [aspirin] Swelling     No past medical history on file.  Past Surgical History:   Procedure Laterality Date     SECTION      HYSTERECTOMY       No family history on file.  Social History     Tobacco Use    Smoking status: Every Day    Smokeless tobacco: Never   Substance Use Topics    Alcohol use: No    Drug use: No     Review of Systems   Constitutional:  Negative for chills, fatigue and fever.   HENT:  Negative for ear pain and sore throat.    Eyes:  Negative for pain.   Respiratory:  Negative for cough and shortness of breath.    Cardiovascular:  Negative for chest pain and " palpitations.   Gastrointestinal:  Negative for abdominal pain, bowel incontinence, nausea and vomiting.   Genitourinary:  Negative for bladder incontinence, dysuria, flank pain and pelvic pain.   Musculoskeletal:  Positive for back pain. Negative for myalgias and neck pain.   Skin:  Negative for rash.   Neurological:  Negative for weakness and headaches.   All other systems reviewed and are negative.    Physical Exam     Initial Vitals [01/14/23 1301]   BP Pulse Resp Temp SpO2   (!) 143/78 93 20 98 °F (36.7 °C) 99 %      MAP       --         Physical Exam    Nursing note and vitals reviewed.  Constitutional: She appears well-developed and well-nourished. No distress.   HENT:   Head: Normocephalic and atraumatic.   Eyes: EOM are normal. Pupils are equal, round, and reactive to light.   Neck: Neck supple.   Normal range of motion.  Cardiovascular:  Normal rate, regular rhythm and intact distal pulses.           Pulmonary/Chest: Breath sounds normal. No respiratory distress.   Abdominal: Abdomen is soft. There is no abdominal tenderness.   No CVA tenderness.   Musculoskeletal:         General: Normal range of motion.      Cervical back: Normal, normal range of motion and neck supple.      Thoracic back: Normal.      Lumbar back: Tenderness present. No swelling, edema, deformity or signs of trauma.        Back:      Neurological: She is alert and oriented to person, place, and time. She has normal strength. No sensory deficit. GCS score is 15. GCS eye subscore is 4. GCS verbal subscore is 5. GCS motor subscore is 6.   Distal sensation is intact.   Skin: Skin is warm and dry. Capillary refill takes less than 2 seconds.       ED Course   Procedures  Labs Reviewed - No data to display       Imaging Results              X-Ray Lumbar Spine Ap And Lateral (Final result)  Result time 01/14/23 14:16:05      Final result by Tomás Osman MD (01/14/23 14:16:05)                   Impression:      No acute abnormality  identified.  Degenerative changes.      Electronically signed by: Tomás Osman  Date:    01/14/2023  Time:    14:16               Narrative:    EXAMINATION:  XR LUMBAR SPINE AP AND LATERAL    CLINICAL HISTORY:  low back pain;    TECHNIQUE:  Five views of the lumbar spine were performed.    COMPARISON:  None    FINDINGS:  Alignment: Alignment is maintained.    Vertebrae: Vertebral body heights are maintained.  No suspicious appearing lytic or blastic lesions.    Discs and facets: Mild scattered disc space height loss.  Facet arthropathy not unexpected for age L3 through S1.    Miscellaneous: No additional findings.                                       Medications   HYDROcodone-acetaminophen 5-325 mg per tablet 1 tablet (1 tablet Oral Given 1/14/23 5281)                      X-ray results reviewed and discussed with patient and she verbalized understanding no further questions or concerns.  Neurovascularly intact.  No bladder/ bowel dysfunction.  Vital signs stable.  Discussed outpatient follow-up with PCP.  Discussed signs symptoms to return to ER.  Patient verbalized understanding states no further questions or concerns.  I discussed with patient  that evaluation in the ED does not suggest any emergent or life threatening medical conditions requiring immediate intervention beyond what was provided in the ED, and I believe patient is safe for discharge. Regardless, an unremarkable evaluation in the ED does not preclude the development or presence of a serious of life threatening condition. As such, patient was instructed to return immediately for any worsening or change in current symptoms.           Clinical Impression:   Final diagnoses:  [M54.50] Low back pain, unspecified back pain laterality, unspecified chronicity, unspecified whether sciatica present (Primary)        ED Disposition Condition    Discharge Stable          ED Prescriptions       Medication Sig Dispense Start Date End Date Auth. Provider     HYDROcodone-acetaminophen (NORCO) 5-325 mg per tablet Take 1 tablet by mouth every 6 (six) hours as needed for Pain. 12 tablet 1/14/2023 1/17/2023 Turner Shannon NP          Follow-up Information       Follow up With Specialties Details Why Contact Info    [facility]  In 2 days  [address] [address] [facility] [address] [facility] [address]  [facility] [address]    [facility] - Emergency Dept. Emergency Medicine  As needed, If symptoms worsen [id number] [facility] [id number] [phone]             Turner Shannon NP  01/14/23 2670

## 2023-01-14 NOTE — Clinical Note
"Tabitha Antony"Tom was seen and treated in our emergency department on 1/14/2023.  She may return to work on 01/16/2023.       If you have any questions or concerns, please don't hesitate to call.      Turner Shannon NP"